# Patient Record
Sex: MALE | Race: WHITE | NOT HISPANIC OR LATINO | URBAN - METROPOLITAN AREA
[De-identification: names, ages, dates, MRNs, and addresses within clinical notes are randomized per-mention and may not be internally consistent; named-entity substitution may affect disease eponyms.]

---

## 2017-08-15 NOTE — PATIENT DISCUSSION
1.  Nuclear Sclerotic Cataract OU:   Mild  --  Vision is getting worse each yr. May need sx in next 1-2 yrs. -Explained how cataracts can effect vision. Recommend clinical observation. The patient was advised to contact us if any change or worsening of vision. 2. Dermatochalasis OU:  Patient currently asymptomatic. Observe. 3. Pt would like to change rx to see if helps with visions. Expl will not be big difference. 4.  Wear suns. 5.   RTN 1 yr CE

## 2021-05-27 ENCOUNTER — PREPPED CHART (OUTPATIENT)
Dept: URBAN - METROPOLITAN AREA CLINIC 20 | Facility: CLINIC | Age: 81
End: 2021-05-27

## 2022-07-11 ENCOUNTER — ESTABLISHED PATIENT (OUTPATIENT)
Dept: URBAN - METROPOLITAN AREA CLINIC 20 | Facility: CLINIC | Age: 82
End: 2022-07-11

## 2022-07-11 DIAGNOSIS — H02.831: ICD-10-CM

## 2022-07-11 DIAGNOSIS — H01.02A: ICD-10-CM

## 2022-07-11 DIAGNOSIS — H02.88A: ICD-10-CM

## 2022-07-11 DIAGNOSIS — H26.493: ICD-10-CM

## 2022-07-11 PROCEDURE — 92014 COMPRE OPH EXAM EST PT 1/>: CPT

## 2022-07-11 ASSESSMENT — VISUAL ACUITY
OS_SC: 20/40+1
OD_SC: 20/40+1
OS_PH: 20/30-1
OD_PH: 20/30-2
OU_CC: J1+

## 2022-07-11 ASSESSMENT — TONOMETRY
OS_IOP_MMHG: 9
OD_IOP_MMHG: 8

## 2022-07-11 ASSESSMENT — KERATOMETRY
OS_K1POWER_DIOPTERS: 41.50
OS_AXISANGLE2_DEGREES: 124
OS_K2POWER_DIOPTERS: 41.75
OD_K2POWER_DIOPTERS: 41.50
OD_AXISANGLE2_DEGREES: 176
OS_AXISANGLE_DEGREES: 34
OD_K1POWER_DIOPTERS: 41.00
OD_AXISANGLE_DEGREES: 86

## 2023-06-09 ENCOUNTER — ESTABLISHED PATIENT (OUTPATIENT)
Dept: URBAN - METROPOLITAN AREA CLINIC 20 | Facility: CLINIC | Age: 83
End: 2023-06-09

## 2023-06-09 DIAGNOSIS — H52.4: ICD-10-CM

## 2023-06-09 DIAGNOSIS — H26.493: ICD-10-CM

## 2023-06-09 DIAGNOSIS — H04.123: ICD-10-CM

## 2023-06-09 DIAGNOSIS — Z96.1: ICD-10-CM

## 2023-06-09 PROCEDURE — 92015 DETERMINE REFRACTIVE STATE: CPT

## 2023-06-09 PROCEDURE — 99214 OFFICE O/P EST MOD 30 MIN: CPT

## 2023-06-09 ASSESSMENT — TONOMETRY
OS_IOP_MMHG: 11
OD_IOP_MMHG: 11

## 2023-06-09 ASSESSMENT — KERATOMETRY
OS_K2POWER_DIOPTERS: 41.75
OS_K1POWER_DIOPTERS: 41.50
OD_K1POWER_DIOPTERS: 41.00
OD_AXISANGLE2_DEGREES: 176
OD_K2POWER_DIOPTERS: 41.50
OS_AXISANGLE2_DEGREES: 124
OD_AXISANGLE_DEGREES: 86
OS_AXISANGLE_DEGREES: 34

## 2023-06-09 ASSESSMENT — VISUAL ACUITY
OD_SC: 20/40
OS_SC: 20/30+2
OU_SC: J10